# Patient Record
Sex: FEMALE | Race: ASIAN
[De-identification: names, ages, dates, MRNs, and addresses within clinical notes are randomized per-mention and may not be internally consistent; named-entity substitution may affect disease eponyms.]

---

## 2019-04-27 ENCOUNTER — HOSPITAL ENCOUNTER (EMERGENCY)
Dept: HOSPITAL 25 - ED | Age: 19
LOS: 1 days | Discharge: HOME | End: 2019-04-28
Payer: SELF-PAY

## 2019-04-27 DIAGNOSIS — F10.129: Primary | ICD-10-CM
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
## 2019-04-27 PROCEDURE — 85025 COMPLETE CBC W/AUTO DIFF WBC: CPT

## 2019-04-27 PROCEDURE — 99283 EMERGENCY DEPT VISIT LOW MDM: CPT

## 2019-04-27 PROCEDURE — 80320 DRUG SCREEN QUANTALCOHOLS: CPT

## 2019-04-27 PROCEDURE — G0480 DRUG TEST DEF 1-7 CLASSES: HCPCS

## 2019-04-27 PROCEDURE — 96374 THER/PROPH/DIAG INJ IV PUSH: CPT

## 2019-04-27 PROCEDURE — 96361 HYDRATE IV INFUSION ADD-ON: CPT

## 2019-04-27 PROCEDURE — 80053 COMPREHEN METABOLIC PANEL: CPT

## 2019-04-27 PROCEDURE — 36415 COLL VENOUS BLD VENIPUNCTURE: CPT

## 2019-04-28 VITALS — SYSTOLIC BLOOD PRESSURE: 105 MMHG | DIASTOLIC BLOOD PRESSURE: 48 MMHG

## 2019-04-28 LAB
ALBUMIN SERPL BCG-MCNC: 4.5 G/DL (ref 3.2–5.2)
ALBUMIN/GLOB SERPL: 1.5 {RATIO} (ref 1–3)
ALP SERPL-CCNC: 48 U/L (ref 34–104)
ALT SERPL W P-5'-P-CCNC: 8 U/L (ref 7–52)
ANION GAP SERPL CALC-SCNC: 11 MMOL/L (ref 2–11)
AST SERPL-CCNC: 15 U/L (ref 13–39)
BASOPHILS # BLD AUTO: 0 10^3/UL (ref 0–0.2)
BUN SERPL-MCNC: 12 MG/DL (ref 6–24)
BUN/CREAT SERPL: 18.2 (ref 8–20)
CALCIUM SERPL-MCNC: 8.4 MG/DL (ref 8.6–10.3)
CHLORIDE SERPL-SCNC: 112 MMOL/L (ref 101–111)
EOSINOPHIL # BLD AUTO: 0 10^3/UL (ref 0–0.6)
GLOBULIN SER CALC-MCNC: 3 G/DL (ref 2–4)
GLUCOSE SERPL-MCNC: 129 MG/DL (ref 70–100)
HCO3 SERPL-SCNC: 20 MMOL/L (ref 22–32)
HCT VFR BLD AUTO: 39 % (ref 33–41)
HGB BLD-MCNC: 12.7 G/DL (ref 12–16)
LYMPHOCYTES # BLD AUTO: 2.4 10^3/UL (ref 1–4.8)
MCH RBC QN AUTO: 29 PG (ref 27–31)
MCHC RBC AUTO-ENTMCNC: 32 G/DL (ref 31–36)
MCV RBC AUTO: 91 FL (ref 80–97)
MONOCYTES # BLD AUTO: 0.3 10^3/UL (ref 0–0.8)
NEUTROPHILS # BLD AUTO: 4.7 10^3/UL (ref 1.5–7.7)
NRBC # BLD AUTO: 0 10^3/UL
NRBC BLD QL AUTO: 0
PLATELET # BLD AUTO: 219 10^3/UL (ref 150–450)
POTASSIUM SERPL-SCNC: 3.4 MMOL/L (ref 3.5–5)
PROT SERPL-MCNC: 7.5 G/DL (ref 6.4–8.9)
RBC # BLD AUTO: 4.34 10^6 /UL (ref 3.7–4.87)
SODIUM SERPL-SCNC: 143 MMOL/L (ref 135–145)
WBC # BLD AUTO: 7.4 10^3/UL (ref 3.5–10.8)

## 2019-04-28 NOTE — ED
Substance Abuse/Use





- HPI Summary


HPI Summary: 


LEVEL 5 CAVEAT DUE TO PATIENT BEING UNRESPONSIVE


This patient is a 18 year old F brought in by ambulance to ED with a chief 

complaint of alcohol intoxication since PTA. En route, the patients BP dropped 

to the 80s, so they put a line in her. Symptoms aggravated by alcohol 

intoxication. Symptoms alleviated by nothing. Patient is nauseous and dry 

heaving in the ER, per the nurse.





- History Of Current Complaint


Stated Complaint: ETOH INTOXICATED PER EMS


Time Seen by Provider: 04/27/19 23:57


Hx Obtained From: EMS, Other: - Nurse


Hx From Patient Unobtainable Due To: Other - LEVEL 5 CAVEAT DUE TO PATIENT 

BEING UNRESPONSIVE


Onset/Duration  of Drug/ETOH Abuse: Minutes - PTA


Timing Of Abuse: Binge Use


Character: Other - unresponsive


Aggravating Factor(s): Other - alcohol intoxication


Alleviating Factor(s): Nothing


Associated Signs And Symptoms: Nausea, Other: - Low BP en route, dry heaving





- Allergies/Home Medications


Allergies/Adverse Reactions: 


 Allergies











Allergy/AdvReac Type Severity Reaction Status Date / Time


 


Unable to Assess Allergy   Verified 04/28/19 00:22











Home Medications: 


 Home Medications





NK [No Home Medications Reported]  04/28/19 [History Confirmed 04/28/19]











PMH/Surg Hx/FS Hx/Imm Hx


Previously Healthy: No - LEVEL 5 CAVEAT DUE TO PATIENT BEING UNRESPONSIVE





- Family History


Known Family History: Positive: Unknown - LEVEL 5 CAVEAT DUE TO PATIENT BEING 

UNRESPONSIVE





- Social History


Alcohol Use: Binge use, LEVEL 5 CAVEAT DUE TO PATIENT BEING UNRESPONSIVE


Substance Use Comment - Amount & Last Used: LEVEL 5 CAVEAT DUE TO PATIENT BEING 

UNRESPONSIVE





Review of Systems


Positive: Other - LEVEL 5 CAVEAT DUE TO PATIENT BEING UNRESPONSIVE, alcohol 

intoxication


Positive: Other - low BP en route, per EMS


Positive: Nausea, Other - dry heaving


All Other Systems Reviewed And Are Negative: No





- Comments


Additional Review of Systems Comments: 


LEVEL 5 CAVEAT DUE TO PATIENT BEING UNRESPONSIVE





Physical Exam





- Summary


Physical Exam Summary: 


LEVEL 5 CAVEAT DUE TO PATIENT BEING UNRESPONSIVE


Appearance: Well appearing, no pain distress


Skin: warm, dry, reflects adequate perfusion


Head/face: normal


Eyes: EOMI, PEDRO


ENT: normal   


Neck: supple, non-tender


Respiratory: CTA, breath sounds present


Cardiovascular: RRR, pulses symmetrical 


Abdomen: non-tender, soft


Musculoskeletal: normal, strength/ROM intact


Neuro: unreponsive, lethargic


Triage Information Reviewed: Yes


Vital Signs On Initial Exam: 





 Initial Vitals











Temp Pulse Resp BP Pulse Ox


 


 97.9 F   66   14   102/55   97 


 


 04/27/19 23:55  04/27/19 23:55  04/27/19 23:55  04/27/19 23:55  04/27/19 23:55











Vital Signs Reviewed: Yes





Diagnostics





- Laboratory


Result Diagrams: 


 04/28/19 00:03





 04/28/19 00:03


Lab Statement: Any lab studies that have been ordered have been reviewed, and 

results considered in the medical decision making process.





Course/Dx





- Course


Assessment/Plan: LEVEL 5 CAVEAT DUE TO PATIENT BEING UNRESPONSIVE.  This 

patient is a 18 year old F brought in by ambulance to ED with a chief complaint 

of alcohol intoxication since PTA. In the ED course, the patient was given 

fluids and zofran. This patient will be discharged after sobering up in the 

morning. Patient is awake and alert in the ER. Patient will be discharged with 

dx of alcohol intoxication. Patient understands and agrees with this plan.





- Diagnoses


Differential Diagnosis/HQI/PQRI: Positive: Alcohol Abuse, Other - alcohol 

intoxication


Provider Diagnoses: 


 Alcohol intoxication








Discharge





- Sign-Out/Discharge


Documenting (check all that apply): Patient Departure - discharge


Patient Received Moderate/Deep Sedation with Procedure: No





- Discharge Plan


Condition: Stable


Disposition: HOME


Patient Education Materials:  Alcohol Intoxication (ED)


Referrals: 


[facility] [place] [facility] [Outside] - 3 Days


Additional Instructions: 


RETURN TO THE ED FOR ANY CHANGING OR WORSENING SYMPTOMS.





- Billing Disposition and Condition


Condition: STABLE


Disposition: Home





- Attestation Statements


Document Initiated by Scribe: Yes


Documenting Scribe: Emanuel Lowry


Provider For Whom Kaye is Documenting (Include Credential): Freedom Lynch MD


Scribe Attestation: 


Emanuel CLIFTON, scribed for Freedom Lynch MD on 04/28/19 at 0617. 


Scribe Documentation Reviewed: Yes


Provider Attestation: 


The documentation as recorded by the Emanuel darby accurately reflects the 

service I personally performed and the decisions made by me, Freedom Lynch MD


Status of Scribe Document: Viewed